# Patient Record
Sex: FEMALE
[De-identification: names, ages, dates, MRNs, and addresses within clinical notes are randomized per-mention and may not be internally consistent; named-entity substitution may affect disease eponyms.]

---

## 2017-05-22 ENCOUNTER — HOSPITAL ENCOUNTER (EMERGENCY)
Dept: HOSPITAL 42 - ED | Age: 37
Discharge: HOME | End: 2017-05-22
Payer: SELF-PAY

## 2017-05-22 VITALS — TEMPERATURE: 98.5 F | RESPIRATION RATE: 16 BRPM | OXYGEN SATURATION: 98 % | HEART RATE: 80 BPM

## 2017-05-22 VITALS — SYSTOLIC BLOOD PRESSURE: 106 MMHG | DIASTOLIC BLOOD PRESSURE: 73 MMHG

## 2017-05-22 VITALS — BODY MASS INDEX: 23.3 KG/M2

## 2017-05-22 DIAGNOSIS — M54.9: Primary | ICD-10-CM

## 2017-05-22 NOTE — ED PDOC
Arrival/HPI





- General


Chief Complaint: Back Pain


Time Seen by Provider: 17 17:35


Historian: Patient





- History of Present Illness


Narrative History of Present Illness (Text): 





17 18:12


This 36 yo female presents to this ED c/o upper back pain x 2 days.  Denies 

trauma, sob, cough, cp, rash, fever, or abnormal gait.  Patient admits lifting 

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


PERC negative for PE


Time/Duration: < week


Context: Home, Work





Past Medical History





- Provider Review


Nursing Documentation Reviewed: Yes





- Infectious Disease


Hx of Infectious Diseases: None





- Tetanus Immunization


Tetanus Immunization: Unknown





- Cardiac


Hx Cardiac Disorders: No





- Pulmonary


Hx Respiratory Disorders: No





- Neurological


Hx Neurological Disorder: No





- HEENT


Hx HEENT Disorder: No





- Renal


Hx Renal Disorder: No





- Endocrine/Metabolic


Hx Endocrine Disorders: No





- Hematological/Oncological


Hx Blood Disorders: No





- Integumentary


Hx Dermatological Disorder: No





- Musculoskeletal/Rheumatological


Hx Falls: No





- Gastrointestinal


Hx Gastrointestinal Disorders: No





- Genitourinary/Gynecological


Hx Genitourinary Disorders: Yes


Other/Comment: Hpv





- Psychiatric


Hx Psychophysiologic Disorder: No


Hx Substance Use: No





- Surgical History


Hx  Section: Yes (x2)





- Anesthesia


Hx Anesthesia: Yes


Hx Anesthesia Reactions: No


Hx Malignant Hyperthermia: No





Family/Social History





- Physician Review


Nursing Documentation Reviewed: Yes


Family/Social History: No Known Family HX


Smoking Status: Never Smoked


Hx Alcohol Use: No


Hx Substance Use: No





Allergies/Home Meds


Allergies/Adverse Reactions: 


Allergies





Penicillins Allergy (Verified 17 17:21)


 RASH


Sulfa (Sulfonamide Antibiotics) Allergy (Verified 17 17:21)


 RASH











Review of Systems





- Review of Systems


Constitutional: Normal.  absent: Fatigue, Weight Change, Fevers, Night Sweats


Eyes: Normal


ENT: Normal


Respiratory: Normal


Cardiovascular: Normal


Gastrointestinal: Normal


Genitourinary Female: Normal


Musculoskeletal: Other (Left upper back pain)


Skin: Normal


Neurological: Normal


Endocrine: Normal


Hemo/Lymphatic: Normal


Psychiatric: Normal





Physical Exam


Vital Signs











  Temp Pulse Resp BP Pulse Ox


 


 17 19:24  98.5 F  80  16   98


 


 17 17:23  98.8 F  79  17  106/73  100











Temperature: Afebrile


Blood Pressure: Normal


Pulse: Regular


Respiratory Rate: Normal


Appearance: Positive for: Well-Appearing, Non-Toxic, Comfortable


Pain Distress: None


Mental Status: Positive for: Alert and Oriented X 3





- Systems Exam


Head: Present: Atraumatic, Normocephalic


Pupils: Present: PERRL


Extroacular Muscles: Present: EOMI


Conjunctiva: Present: Normal


Mouth: Present: Moist Mucous Membranes


Neck: Present: Normal Range of Motion


Respiratory/Chest: Present: Clear to Auscultation, Good Air Exchange, Tender to 

Palpation (Mild tenderness over left scapula area.  Pain is 100 % reproducible.)

.  No: Respiratory Distress, Accessory Muscle Use, Decreased Breath Sounds, 

Rales, Retracting


Cardiovascular: Present: Regular Rate and Rhythm, Normal S1, S2.  No: Murmurs


Abdomen: Present: Normal Bowel Sounds.  No: Tenderness, Distention, Peritoneal 

Signs


Back: Present: Normal Inspection


Upper Extremity: Present: Normal Inspection.  No: Cyanosis, Edema


Lower Extremity: Present: Normal Inspection.  No: Edema


Neurological: Present: GCS=15, CN II-XII Intact, Speech Normal


Skin: Present: Warm, Dry, Normal Color.  No: Rashes


Psychiatric: Present: Alert, Oriented x 3, Normal Insight, Normal Concentration





Medical Decision Making


ED Course and Treatment: 





17 19:13


Re-evaluation.  Patient feels better.  Discussed results and plan with patient 

who expresses understanding.  All questions answered and there is agreement 

with the plan to discharge home with instructions.  Patient stable for 

discharge.  Return if symptoms persist or worsen.


Re-evaluation Time: 19:13


Reassessment Condition: Re-examined, Improved





- RAD Interpretation


Radiology Orders: 








17 17:44


CHEST TWO VIEWS (PA/LAT) [RAD] Stat 














- Medication Orders


Current Medication Orders: 











Discontinued Medications





Naproxen (Anaprox Ds)  550 mg PO STAT STA


   Stop: 17 19:12


   Last Admin: 17 19:23  Dose: 550 mg











Disposition/Present on Arrival





- Present on Arrival


Any Indicators Present on Arrival: No


History of DVT/PE: No


History of Uncontrolled Diabetes: No


Urinary Catheter: No


History of Decub. Ulcer: No


History Surgical Site Infection Following: None





- Disposition


Have Diagnosis and Disposition been Completed?: Yes


Diagnosis: 


 Upper back pain





Disposition: HOME/ ROUTINE


Disposition Time: 19:14


Patient Plan: Discharge


Condition: GOOD


Discharge Instructions (ExitCare):  Back Pain (ED)


Additional Instructions: 


Call private doctor for follow up visit in 1-2 days.   Take medication as 

instructed with food.   Return to emergency if pain worsen.


Prescriptions: 


Famotidine [Pepcid] 40 mg PO DAILY #14 tablet


Naproxen 500 mg PO BID PRN #14 tab


 PRN Reason: Pain, Severe (8-10)


Referrals: 


Jun Haq MD [Family Provider] - Follow up with primary

## 2017-05-23 NOTE — RAD
HISTORY:

upper back pain  



COMPARISON:

8/18/2016



TECHNIQUE:

Chest PA and lateral



FINDINGS:



LUNGS:

No active pulmonary disease.



PLEURA:

No significant pleural effusion identified. No pneumothorax apparent.



CARDIOVASCULAR:

Normal.



OSSEOUS STRUCTURES:

No significant abnormalities.



VISUALIZED UPPER ABDOMEN:

Normal.



OTHER FINDINGS:

None.



IMPRESSION:

No active disease.

## 2017-08-17 ENCOUNTER — HOSPITAL ENCOUNTER (EMERGENCY)
Dept: HOSPITAL 42 - ED | Age: 37
Discharge: HOME | End: 2017-08-17
Payer: COMMERCIAL

## 2017-08-17 VITALS — BODY MASS INDEX: 24 KG/M2

## 2017-08-17 VITALS — TEMPERATURE: 98.3 F

## 2017-08-17 VITALS — HEART RATE: 80 BPM | DIASTOLIC BLOOD PRESSURE: 66 MMHG | SYSTOLIC BLOOD PRESSURE: 97 MMHG | RESPIRATION RATE: 18 BRPM

## 2017-08-17 VITALS — OXYGEN SATURATION: 100 %

## 2017-08-17 DIAGNOSIS — G44.209: Primary | ICD-10-CM

## 2017-08-17 PROCEDURE — 96375 TX/PRO/DX INJ NEW DRUG ADDON: CPT

## 2017-08-17 PROCEDURE — 70450 CT HEAD/BRAIN W/O DYE: CPT

## 2017-08-17 PROCEDURE — 96374 THER/PROPH/DIAG INJ IV PUSH: CPT

## 2017-08-17 PROCEDURE — 99285 EMERGENCY DEPT VISIT HI MDM: CPT

## 2017-08-17 NOTE — ED PDOC
Arrival/HPI





- General


Chief Complaint: Headache


Time Seen by Provider: 17 13:03


Historian: Patient





- History of Present Illness


Narrative History of Present Illness (Text): 





17 13:04


36 y/o female, pmh including ovarian cyst/hyperlipidemia, penicillin and sulfa 

allergy, c/o headache with nausea x 4 days with no fall or trauma.  Generalized 

headache, tightness sensation around the head, gradually onset, on and off, no 

neck stiffness, no change in vision, headache associated with nausea but no 

vomiting, no chest pain or shortness of breath, no palpitation, no night sweat, 

no other medical or psychological complaints. 








Past Medical History





- Provider Review


Nursing Documentation Reviewed: Yes





- Infectious Disease


Hx of Infectious Diseases: None





- Tetanus Immunization


Tetanus Immunization: Unknown





- Cardiac


Hx Cardiac Disorders: No





- Pulmonary


Hx Respiratory Disorders: No





- Neurological


Hx Migraine: Yes





- HEENT


Hx HEENT Disorder: No





- Renal


Hx Renal Disorder: No





- Endocrine/Metabolic


Hx Endocrine Disorders: No





- Hematological/Oncological


Hx Blood Disorders: No





- Integumentary


Hx Dermatological Disorder: No





- Musculoskeletal/Rheumatological


Hx Falls: No





- Gastrointestinal


Hx Gastrointestinal Disorders: No





- Genitourinary/Gynecological


Hx Genitourinary Disorders: Yes


Other/Comment: Hpv





- Psychiatric


Hx Psychophysiologic Disorder: No


Hx Substance Use: No





- Surgical History


Hx  Section: Yes (x2)





- Anesthesia


Hx Anesthesia: Yes


Hx Anesthesia Reactions: No


Hx Malignant Hyperthermia: No





Family/Social History





- Physician Review


Nursing Documentation Reviewed: Yes


Family/Social History: Unknown Family HX


Smoking Status: Never Smoked


Hx Alcohol Use: No


Hx Substance Use: No





Allergies/Home Meds


Allergies/Adverse Reactions: 


Allergies





Penicillins Allergy (Verified 17 17:21)


 RASH


Sulfa (Sulfonamide Antibiotics) Allergy (Verified 17 17:21)


 RASH








Home Medications: 


 Home Meds











 Medication  Instructions  Recorded  Confirmed


 


Simvastatin [Simvastatin] 1 tab PO DAILY 17














Review of Systems





- Review of Systems


Constitutional: absent: Fatigue, Fevers


Eyes: absent: Vision Changes


ENT: absent: Hearing Changes


Respiratory: absent: SOB, Cough


Cardiovascular: absent: Chest Pain


Gastrointestinal: Nausea.  absent: Abdominal Pain, Diarrhea, Vomiting


Skin: absent: Rash, Pruritis


Neurological: Headache.  absent: Dizziness, Focal Weakness, Speech Changes





Physical Exam


Vital Signs Reviewed: Yes


Vital Signs











  Temp Pulse Resp BP Pulse Ox


 


 17 13:02  98.3 F  93 H  18  112/73  99











Temperature: Afebrile


Blood Pressure: Normal


Pulse: Regular


Respiratory Rate: Normal


Appearance: Positive for: Well-Appearing, Non-Toxic


Pain Distress: Severe


Mental Status: Positive for: Alert and Oriented X 3





- Systems Exam


Head: Present: Atraumatic, Normocephalic, Other (no temporal artery tenderness 

or jaw claudication )


Pupils: Present: PERRL


Extroacular Muscles: Present: EOMI


Conjunctiva: Present: Normal


Ears: Present: NORMAL TM, Normal Canal.  No: Erythema


Mouth: Present: Moist Mucous Membranes


Neck: Present: Normal Range of Motion, Paraspinal Tenderness (+ttp on the rt. 

paraspinal muscle region but no midline tenderness or step off. ), Trachea 

Midline.  No: Meningeal Signs, MIDLINE TENDERNESS


Respiratory/Chest: Present: Clear to Auscultation, Good Air Exchange.  No: 

Respiratory Distress, Accessory Muscle Use


Cardiovascular: Present: Regular Rate and Rhythm, Normal S1, S2.  No: Murmurs


Abdomen: Present: Normal Bowel Sounds.  No: Tenderness, Distention, Peritoneal 

Signs


Back: Present: Normal Inspection


Upper Extremity: Present: Normal Inspection.  No: Cyanosis, Edema


Lower Extremity: Present: Normal Inspection.  No: Edema


Neurological: Present: GCS=15, Speech Normal, Motor Func Grossly Intact, Gait 

Normal, Memory Normal, Other (no drift, no slurred speech)


Skin: Present: Warm, Dry, Normal Color.  No: Rashes


Psychiatric: Present: Alert, Oriented x 3, Normal Insight, Normal Concentration





Medical Decision Making


ED Course and Treatment: 





17 13:09


-IVF/benadryl/reglan/reglan (when CT head is negative)


-CT head


-observe and reassess





17 15:03


-CT head show no acute findings


-Headache resolved, feeling much better, explained all radiology result with 

the patient.


-Discharge home with naproxen, bed rest, stay hydraed, avoid over working or 

over stressing, follow up with your own pmd and neurologist within 2 days, 

return to the ER for any new or worsening signs or symptoms. 





- RAD Interpretation


Radiology Orders: 








17 13:06


HEAD W/O CONTRAST [CT] Stat 








Normal CT of the head


: Radiologist





- Medication Orders


Current Medication Orders: 








Ketorolac Tromethamine (Toradol)  30 mg IVP STAT STA


   Stop: 17 14:55





Discontinued Medications





Diphenhydramine HCl (Benadryl)  50 mg IVP STAT STA


   Stop: 17 13:07


   Last Admin: 17 13:36  Dose: 50 mg





Sodium Chloride (Sodium Chloride 0.9%)  1,000 mls @ 999 mls/hr IV .Q1H1M STA


   Stop: 17 14:06


   Last Admin: 17 13:36  Dose: 999 mls/hr





Metoclopramide HCl (Reglan)  10 mg IVP STAT STA


   Stop: 17 13:07


   Last Admin: 17 13:36  Dose: 10 mg











- PA / NP / Resident Statement


MD/ has reviewed & agrees with the documentation as recorded.





Disposition/Present on Arrival





- Present on Arrival


Any Indicators Present on Arrival: No


History of DVT/PE: No


History of Uncontrolled Diabetes: No


Urinary Catheter: No


History of Decub. Ulcer: No


History Surgical Site Infection Following: None





- Disposition


Have Diagnosis and Disposition been Completed?: Yes


Diagnosis: 


 Tension headache





Disposition: HOME/ ROUTINE


Disposition Time: 15:03


Patient Plan: Discharge


Condition: IMPROVED


Additional Instructions: 


-Discharge home with naproxen, bed rest, stay hydraed, avoid over working or 

over stressing, follow up with your own pmd and neurologist within 2 days, 

return to the ER for any new or worsening signs or symptoms. 


Prescriptions: 


Naproxen 500 mg PO BID PRN #20 tab


 PRN Reason: Other


Referrals: 


Jun Haq MD [Primary Care Provider] - Follow up with primary


Jn Swartz MD [Staff Provider] - Follow up with primary


Forms:  [employer]) (English), WORK NOTE

## 2017-08-17 NOTE — CT
PROCEDURE:  CT HEAD WITHOUT CONTRAST.



HISTORY:

generalized headache x 4 days



COMPARISON:

None available. 



TECHNIQUE:

Axial computed tomography images were obtained through the head/brain 

without intravenous contrast.  



Radiation dose:



Total exam DLP = 734 mGy-cm.



This CT exam was performed using one or more of the following dose 

reduction techniques: Automated exposure control, adjustment of the 

mA and/or kV according to patient size, and/or use of iterative 

reconstruction technique.



FINDINGS:



HEMORRHAGE:

No intracranial hemorrhage. 



BRAIN:

No mass effect or edema.  No atrophy or chronic microvascular 

ischemic changes.



VENTRICLES:

Unremarkable. No hydrocephalus. 



CALVARIUM:

Unremarkable.



PARANASAL SINUSES:

Unremarkable as visualized. No significant inflammatory changes.



MASTOID AIR CELLS:

Unremarkable as visualized. No inflammatory changes.



OTHER FINDINGS:

None.



IMPRESSION:

Normal CT of the Head.

## 2018-03-16 ENCOUNTER — HOSPITAL ENCOUNTER (EMERGENCY)
Dept: HOSPITAL 42 - ED | Age: 38
Discharge: HOME | End: 2018-03-16
Payer: COMMERCIAL

## 2018-03-16 VITALS — DIASTOLIC BLOOD PRESSURE: 60 MMHG | OXYGEN SATURATION: 99 % | SYSTOLIC BLOOD PRESSURE: 94 MMHG | HEART RATE: 79 BPM

## 2018-03-16 VITALS — TEMPERATURE: 98.5 F

## 2018-03-16 VITALS — RESPIRATION RATE: 18 BRPM

## 2018-03-16 VITALS — BODY MASS INDEX: 24 KG/M2

## 2018-03-16 DIAGNOSIS — R07.9: Primary | ICD-10-CM

## 2018-03-16 LAB
ALBUMIN SERPL-MCNC: 4.7 G/DL
ALBUMIN/GLOB SERPL: 1.2 {RATIO}
ALT SERPL-CCNC: 21 U/L
AST SERPL-CCNC: 21 U/L
BASOPHILS # BLD AUTO: 0.03 K/MM3
BASOPHILS NFR BLD: 0.3 %
BUN SERPL-MCNC: 8 MG/DL
CALCIUM SERPL-MCNC: 10.2 MG/DL
EOSINOPHIL # BLD: 0 10*3/UL
EOSINOPHIL NFR BLD: 0.4 %
ERYTHROCYTE [DISTWIDTH] IN BLOOD BY AUTOMATED COUNT: 12.9 %
GFR NON-AFRICAN AMERICAN: > 60
GRANULOCYTES # BLD: 5.74 10*3/UL
GRANULOCYTES NFR BLD: 57.1 %
HGB BLD-MCNC: 12.3 G/DL
LYMPHOCYTES # BLD: 3.7 10*3/UL
LYMPHOCYTES NFR BLD AUTO: 36.7 %
MCH RBC QN AUTO: 29 PG
MCHC RBC AUTO-ENTMCNC: 33.5 G/DL
MCV RBC AUTO: 86.6 FL
MONOCYTES # BLD AUTO: 0.6 10*3/UL
MONOCYTES NFR BLD: 5.5 %
PLATELET # BLD: 317 10^3/UL
PMV BLD AUTO: 9.8 FL
RBC # BLD AUTO: 4.24 10^6/UL
TROPONIN I SERPL-MCNC: < 0.01 NG/ML
WBC # BLD AUTO: 10 10^3/UL

## 2018-03-16 NOTE — ED PDOC
Arrival/HPI





- General


Chief Complaint: Chest Pain


Time Seen by Provider: 18 19:30





- History of Present Illness


Narrative History of Present Illness (Text): 


37 y/o F c PSHx C section, most recently 2 years ago, non smoker p/w 

palpitations and chest pain x 1 week. States she gets intermittent palpitations

, after which she feels a nonradiating, sharp L parasternal chest pain and L 

upper thoracic back pain for about 2 hours. She states she does feel mildly 

short of breath. She denies fever, chills, nausea, vomiting, leg swelling, 

recent travel, recent injury.





Past Medical History





- Infectious Disease


Hx of Infectious Diseases: None





- Tetanus Immunization


Tetanus Immunization: Unknown





- Cardiac


Hx Cardiac Disorders: No





- Pulmonary


Hx Respiratory Disorders: No





- Neurological


Hx Migraine: Yes





- HEENT


Hx HEENT Disorder: No





- Renal


Hx Renal Disorder: No





- Endocrine/Metabolic


Hx Endocrine Disorders: No





- Hematological/Oncological


Hx Blood Disorders: No





- Integumentary


Hx Dermatological Disorder: No





- Musculoskeletal/Rheumatological


Hx Falls: No





- Gastrointestinal


Hx Gastrointestinal Disorders: No





- Genitourinary/Gynecological


Hx Genitourinary Disorders: Yes


Other/Comment: Hpv





- Psychiatric


Hx Psychophysiologic Disorder: No


Hx Substance Use: No





- Surgical History


Hx  Section: Yes (x2)





- Anesthesia


Hx Anesthesia: Yes


Hx Anesthesia Reactions: No


Hx Malignant Hyperthermia: No





Family/Social History


Family/Social History: No Known Family HX


Smoking Status: Never Smoked


Hx Alcohol Use: No


Hx Substance Use: No





Allergies/Home Meds


Allergies/Adverse Reactions: 


Allergies





Penicillins Allergy (Verified 18 19:21)


 RASH


Sulfa (Sulfonamide Antibiotics) Allergy (Verified 18 19:21)


 RASH








Home Medications: 


 Home Meds











 Medication  Instructions  Recorded  Confirmed


 


Simvastatin [Simvastatin] 1 tab PO DAILY 17














Review of Systems





- Physician Review


All systems were reviewed & negative as marked: Yes





- Review of Systems


Constitutional: absent: Fevers


Gastrointestinal: absent: Vomiting





Physical Exam





- Physical Exam


Narrative Physical Exam (Text): 


Gen: NAD


Head: NC


Eyes: No scleral icterus


ENT: MMM


Neck: Supple


Chest: No tenderness


CV: Borderline tachycardia


Lungs: CTA b/l


Abd: Soft, NT


Back: No CVA tenderness


Extremities: No swelling or tenderness


Skin: No rash


Neuro: Alert, no focal deficit


Vital Signs











  Temp Pulse Resp BP Pulse Ox


 


 18 21:36   85  18  101/68  100


 


 18 19:21  98.5 F  97 H  19  110/88  100


 


 18 19:19  98.5 F  97 H  19  110/88  100














Medical Decision Making


ED Course and Treatment: 


EKG NSR 89 bpm, no ST eelvations, normal axis.





CXR no PTX, pleural effusion, rib fracture, consolidation as read by me.





Patient in no distress, will discharge home, f/u primary care, return to ED for 

worsening breathing, pain, fever, or any other problem.





- Lab Interpretations


Lab Results: 








 18 19:50 





 18 19:50 





 Lab Results





18 19:50: Urine HCG, Qual Negative


18 19:50: Sodium 142, Potassium 4.0, Chloride 105, Carbon Dioxide 25, 

Anion Gap 16, BUN 8, Creatinine 0.6 L, Est GFR ( Amer) > 60, Est GFR (Non

-Af Amer) > 60, Random Glucose 95, Calcium 10.2, Total Bilirubin 0.2, AST 21, 

ALT 21, Alkaline Phosphatase 69, Total Creatine Kinase 58, Troponin I < 0.01, 

Total Protein 8.7 H, Albumin 4.7, Globulin 3.9, Albumin/Globulin Ratio 1.2


18 19:50: D-Dimer, Quantitative < 200


18 19:50: WBC 10.0  D, RBC 4.24, Hgb 12.3, Hct 36.7, MCV 86.6, MCH 29.0, 

MCHC 33.5, RDW 12.9, Plt Count 317, MPV 9.8, Gran % 57.1, Lymph % (Auto) 36.7 H

, Mono % (Auto) 5.5, Eos % (Auto) 0.4 L, Baso % (Auto) 0.3, Gran # 5.74, Lymph 

# (Auto) 3.7 H, Mono # (Auto) 0.6, Eos # (Auto) 0.0, Baso # (Auto) 0.03











- RAD Interpretation


Radiology Orders: 








18 19:39


CHEST TWO VIEWS (PA/LAT) [RAD] Stat 














Disposition/Present on Arrival





- Present on Arrival


Any Indicators Present on Arrival: No


History of DVT/PE: No


History of Uncontrolled Diabetes: No


Urinary Catheter: No


History of Decub. Ulcer: No


History Surgical Site Infection Following: None





- Disposition


Have Diagnosis and Disposition been Completed?: Yes


Diagnosis: 


 Chest pain





Disposition: HOME/ ROUTINE


Disposition Time: 22:01


Patient Plan: Discharge


Condition: STABLE


Discharge Instructions (ExitCare):  Chest Pain (ED)


Referrals: 


Jun Haq MD [Primary Care Provider] - Follow up with primary


Forms:  [employer] (English)

## 2018-03-17 NOTE — CARD
--------------- APPROVED REPORT --------------





EKG Measurement

Heart [id number]

ME [id number]

[id number]

[id number]

[id number]



<Conclusion>

Normal sinus rhythm

Possible Left atrial enlargement

Septal infarct, old

NSSTW changes

No change

## 2018-05-27 ENCOUNTER — HOSPITAL ENCOUNTER (EMERGENCY)
Dept: HOSPITAL 42 - ED | Age: 38
Discharge: HOME | End: 2018-05-27
Payer: COMMERCIAL

## 2018-05-27 VITALS — HEART RATE: 68 BPM | SYSTOLIC BLOOD PRESSURE: 109 MMHG | DIASTOLIC BLOOD PRESSURE: 75 MMHG

## 2018-05-27 VITALS — RESPIRATION RATE: 18 BRPM

## 2018-05-27 VITALS — BODY MASS INDEX: 24 KG/M2

## 2018-05-27 VITALS — TEMPERATURE: 98.7 F | OXYGEN SATURATION: 99 %

## 2018-05-27 DIAGNOSIS — R10.13: ICD-10-CM

## 2018-05-27 DIAGNOSIS — N83.201: Primary | ICD-10-CM

## 2018-05-27 LAB
ALBUMIN SERPL-MCNC: 4.5 G/DL (ref 3–4.8)
ALBUMIN/GLOB SERPL: 1.3 {RATIO} (ref 1.1–1.8)
ALT SERPL-CCNC: 29 U/L (ref 7–56)
APPEARANCE UR: CLEAR
AST SERPL-CCNC: 22 U/L (ref 14–36)
BACTERIA #/AREA URNS HPF: (no result) /[HPF]
BASOPHILS # BLD AUTO: 0.02 K/MM3 (ref 0–2)
BASOPHILS NFR BLD: 0.3 % (ref 0–3)
BILIRUB UR-MCNC: NEGATIVE MG/DL
BUN SERPL-MCNC: 13 MG/DL (ref 7–21)
CALCIUM SERPL-MCNC: 9.1 MG/DL (ref 8.4–10.5)
COLOR UR: YELLOW
EOSINOPHIL # BLD: 0.1 10*3/UL (ref 0–0.7)
EOSINOPHIL NFR BLD: 1.7 % (ref 1.5–5)
ERYTHROCYTE [DISTWIDTH] IN BLOOD BY AUTOMATED COUNT: 13.1 % (ref 11.5–14.5)
GFR NON-AFRICAN AMERICAN: > 60
GLUCOSE UR STRIP-MCNC: NEGATIVE MG/DL
GRANULOCYTES # BLD: 3.5 10*3/UL (ref 1.4–6.5)
GRANULOCYTES NFR BLD: 54.4 % (ref 50–68)
HCG,QUALITATIVE URINE: NEGATIVE
HGB BLD-MCNC: 11.7 G/DL (ref 12–16)
INR PPP: 1.04 (ref 0.93–1.08)
LEUKOCYTE ESTERASE UR-ACNC: NEGATIVE LEU/UL
LIPASE SERPL-CCNC: 92 U/L (ref 23–300)
LYMPHOCYTES # BLD: 2.3 10*3/UL (ref 1.2–3.4)
LYMPHOCYTES NFR BLD AUTO: 36.3 % (ref 22–35)
MCH RBC QN AUTO: 29 PG (ref 25–35)
MCHC RBC AUTO-ENTMCNC: 33.6 G/DL (ref 31–37)
MCV RBC AUTO: 86.1 FL (ref 80–105)
MONOCYTES # BLD AUTO: 0.5 10*3/UL (ref 0.1–0.6)
MONOCYTES NFR BLD: 7.3 % (ref 1–6)
PH UR STRIP: 6.5 [PH] (ref 4.7–8)
PLATELET # BLD: 325 10^3/UL (ref 120–450)
PMV BLD AUTO: 9.7 FL (ref 7–11)
PROT UR STRIP-MCNC: NEGATIVE MG/DL
PROTHROMBIN TIME: 12 SECONDS (ref 9.4–12.5)
RBC # BLD AUTO: 4.04 10^6/UL (ref 3.5–6.1)
RBC # UR STRIP: (no result) /UL
RBC #/AREA URNS HPF: (no result) /HPF (ref 0–2)
SP GR UR STRIP: <= 1.005 (ref 1–1.03)
UROBILINOGEN UR STRIP-ACNC: 0.2 E.U./DL
WBC # BLD AUTO: 6.4 10^3/UL (ref 4.5–11)
WBC #/AREA URNS HPF: (no result) /HPF (ref 0–6)

## 2018-05-27 PROCEDURE — 84703 CHORIONIC GONADOTROPIN ASSAY: CPT

## 2018-05-27 PROCEDURE — 96361 HYDRATE IV INFUSION ADD-ON: CPT

## 2018-05-27 PROCEDURE — 76700 US EXAM ABDOM COMPLETE: CPT

## 2018-05-27 PROCEDURE — 81025 URINE PREGNANCY TEST: CPT

## 2018-05-27 PROCEDURE — 74177 CT ABD & PELVIS W/CONTRAST: CPT

## 2018-05-27 PROCEDURE — 83690 ASSAY OF LIPASE: CPT

## 2018-05-27 PROCEDURE — 85610 PROTHROMBIN TIME: CPT

## 2018-05-27 PROCEDURE — 81001 URINALYSIS AUTO W/SCOPE: CPT

## 2018-05-27 PROCEDURE — 80053 COMPREHEN METABOLIC PANEL: CPT

## 2018-05-27 PROCEDURE — 96374 THER/PROPH/DIAG INJ IV PUSH: CPT

## 2018-05-27 PROCEDURE — 85025 COMPLETE CBC W/AUTO DIFF WBC: CPT

## 2018-05-27 PROCEDURE — 99285 EMERGENCY DEPT VISIT HI MDM: CPT

## 2018-05-27 NOTE — ED PDOC
Arrival/HPI





- General


Chief Complaint: Abdominal Pain


Time Seen by Provider: 18 11:05


Historian: Patient





- History of Present Illness


Narrative History of Present Illness (Text): 


18 11:38


A 38 year old female, whose past medical history includes HPV, presents to the 

emergency department complaining of epigastric pain for 4 days. Patient reports 

symptom worsens when eating. Patient denies any other complaints at this time. 

Also, patient denies any family history of gallbladder disease or gallstones.





No PMD








Past Medical History





- Provider Review


Nursing Documentation Reviewed: Yes





- Infectious Disease


Hx of Infectious Diseases: None





- Tetanus Immunization


Tetanus Immunization: Unknown





- Cardiac


Hx Cardiac Disorders: No





- Pulmonary


Hx Respiratory Disorders: No





- Neurological


Hx Migraine: Yes





- HEENT


Hx HEENT Disorder: No





- Renal


Hx Renal Disorder: No





- Endocrine/Metabolic


Hx Endocrine Disorders: No





- Hematological/Oncological


Hx Blood Disorders: No





- Integumentary


Hx Dermatological Disorder: No





- Musculoskeletal/Rheumatological


Hx Falls: No





- Gastrointestinal


Hx Gastrointestinal Disorders: No





- Genitourinary/Gynecological


Hx Genitourinary Disorders: Yes


Other/Comment: Hpv





- Psychiatric


Hx Psychophysiologic Disorder: No


Hx Substance Use: No





- Surgical History


Hx  Section: Yes (x2)





- Anesthesia


Hx Anesthesia: Yes


Hx Anesthesia Reactions: No


Hx Malignant Hyperthermia: No





Family/Social History





- Physician Review


Nursing Documentation Reviewed: Yes


Family/Social History: No Known Family HX


Smoking Status: Never Smoked


Hx Alcohol Use: No


Hx Substance Use: No





Allergies/Home Meds


Allergies/Adverse Reactions: 


Allergies





Penicillins Allergy (Verified 18 19:21)


 RASH


Sulfa (Sulfonamide Antibiotics) Allergy (Verified 18 19:21)


 RASH








Home Medications: 


 Home Meds











 Medication  Instructions  Recorded  Confirmed


 


Simvastatin [Simvastatin] 1 tab PO DAILY 17














Review of Systems





- Physician Review


All systems were reviewed & negative as marked: Yes





- Review of Systems


Constitutional: absent: Fevers, Night Sweats


Respiratory: absent: SOB, Cough


Cardiovascular: absent: Chest Pain, Palpitations


Gastrointestinal: Abdominal Pain (epigastric region).  absent: Diarrhea, Nausea

, Vomiting


Neurological: absent: Headache, Dizziness





Physical Exam


Vital Signs Reviewed: Yes


Vital Signs











  Temp Pulse Resp BP Pulse Ox


 


 18 14:00   70  18  107/73  99


 


 18 11:17   79  18  105/68  99


 


 18 10:16  98.7 F  82  16  103/72  99











Temperature: Afebrile


Blood Pressure: Normal


Pulse: Regular


Respiratory Rate: Normal


Appearance: Positive for: Well-Appearing


Pain Distress: None


Mental Status: Positive for: Alert and Oriented X 3





- Systems Exam


Respiratory/Chest: Present: Clear to Auscultation, Good Air Exchange.  No: 

Respiratory Distress, Accessory Muscle Use


Cardiovascular: Present: Regular Rate and Rhythm, Normal S1, S2.  No: Murmurs


Abdomen: Present: Tenderness (epigastric region), Other (positive Haskins's sign)

.  No: Peritoneal Signs


Upper Extremity: Present: Normal Inspection.  No: Cyanosis, Edema


Lower Extremity: Present: Normal Inspection.  No: Edema


Neurological: Present: GCS=15, CN II-XII Intact, Speech Normal


Skin: Present: Warm, Dry, Normal Color.  No: Rashes


Psychiatric: Present: Alert, Oriented x 3, Normal Insight, Normal Concentration





Medical Decision Making


ED Course and Treatment: 


18 11:41


Impression: 38 year old female with epigastric pain. Physical exam shows 

tenderness to epigastric region and positive Haskins's sign.





Plan:


-- Abdominal Ultrasound


-- Abd/Pelvis CT


-- Labs


-- Urinalysis


-- Toradol


-- Morphine


-- Zofran


-- IV Fluids


-- Reassess and disposition





Prior Visits:


Notes and results from previous visits were reviewed. Patient was last seen in 

the emergency department on 2018 for palpitations and chest pain. Patient 

was discharged home.





Progress Notes:


2018 12:37


Abdominal Ultrasound


IMPRESSION: 


Increased hepatic echotexture likely due to fatty infiltration however other 

infiltrative hepatic cellular disease process not excluded.


No evidence cholelithiasis.


Dictator: Rosy Capps DO








- Lab Interpretations


Lab Results: 








 18 11:41 





 18 11:41 





 Lab Results





18 11:41: Sodium 146, Potassium 3.8, Chloride 106, Carbon Dioxide 27, 

Anion Gap 17, BUN 13, Creatinine 0.6 L, Est GFR ( Amer) > 60, Est GFR (

Non-Af Amer) > 60, Random Glucose 94, Calcium 9.1, Total Bilirubin 0.3, AST 22, 

ALT 29, Alkaline Phosphatase 59, Total Protein 8.0, Albumin 4.5, Globulin 3.5, 

Albumin/Globulin Ratio 1.3, Lipase 92


18 11:41: PT 12.0, INR 1.04


18 11:41: WBC 6.4  D, RBC 4.04, Hgb 11.7 L, Hct 34.8 L, MCV 86.1, MCH 29.0

, MCHC 33.6, RDW 13.1, Plt Count 325, MPV 9.7, Gran % 54.4, Lymph % (Auto) 36.3 

H, Mono % (Auto) 7.3 H, Eos % (Auto) 1.7, Baso % (Auto) 0.3, Gran # 3.50, Lymph 

# (Auto) 2.3, Mono # (Auto) 0.5, Eos # (Auto) 0.1, Baso # (Auto) 0.02


18 10:50: Urine Color Yellow, Urine Appearance Clear, Urine pH 6.5, Ur 

Specific Gravity <= 1.005, Urine Protein Negative, Urine Glucose (UA) Negative, 

Urine Ketones Negative, Urine Blood Trace-intact H, Urine Nitrate Negative, 

Urine Bilirubin Negative, Urine Urobilinogen 0.2, Ur Leukocyte Esterase Negative

, Urine RBC 0 - 2, Urine WBC 0 - 2, Ur Epithelial Cells 1 - 3, Urine Bacteria 

Few, Urine HCG, Qual Negative








I have reviewed the lab results: Yes





- RAD Interpretation


Radiology Orders: 








18 11:35


ABDOMEN COMPLETE [US] Stat 





18 13:07


ABD PELVIS PO & IV CONTRAST [CT] Stat 














- Medication Orders


Current Medication Orders: 











Discontinued Medications





Sodium Chloride (Sodium Chloride 0.9%)  1,000 mls @ 999 mls/hr IV .Q1H1M STA


   Stop: 18 12:36


   Last Admin: 18 12:53  Dose: 999 mls/hr





eMAR Start Stop


 Document     18 12:53  SRE  (Rec: 18 12:53  SRE  [id number])


     Intravenous Solution


      Start Date                                 18


      Start Time                                 12:00


      End Date                                   18


      End time                                   13:00


      Total Infusion Time                        60





Sodium Chloride (Sodium Chloride 0.9%)  1,000 mls @ 999 mls/hr IV .Q1H1M STA


   Stop: 18 12:38


   Last Admin: 18 12:52  Dose: 999 mls/hr





eMAR Start Stop


 Document     18 12:52  SRE  (Rec: 18 12:52  SRE  [id number])


     Intravenous Solution


      Start Date                                 18


      Start Time                                 11:40


      End Date                                   18


      End time                                   12:40


      Total Infusion Time                        60





Ketorolac Tromethamine (Toradol)  30 mg IVP STAT STA


   Stop: 18 11:40


   Last Admin: 18 12:49  Dose: 30 mg





MAR Pain Assessment


 Document     18 12:49  SRE  (Rec: 18 12:50  SRE  [id number])


     Pain Reassessment


      Is this a pain reassessment?               Yes


     Sleep


      Is patient sleeping during reassessment?   No


     Presence of Pain


      Presence of Pain                           Yes


     Location


      Pain Location Body Site                    Abdomen


IVP Administration


 Document     18 12:49  SRE  (Rec: 18 12:50  SRE  [id number])


     Charges for Administration


      # of IVP Administrations                   1





Morphine Sulfate (Morphine)  4 mg IVP STAT STA


   Stop: 18 11:38


   Last Admin: 18 11:40  Dose:  





Ondansetron HCl (Zofran Inj)  4 mg IVP STAT STA


   Stop: 18 11:39


   Last Admin: 18 11:40  Dose:  











- Scribe Statement


The provider has reviewed the documentation as recorded by the Vin Gutierrez





Provider Scribe Attestation:


All medical record entries made by the Shyamibhernan were at my direction and 

personally dictated by me. I have reviewed the chart and agree that the record 

accurately reflects my personal performance of the history, physical exam, 

medical decision making, and the department course for this patient. I have 

also personally directed, reviewed, and agree with the discharge instructions 

and disposition.








Disposition/Present on Arrival





- Present on Arrival


Any Indicators Present on Arrival: No


History of DVT/PE: No


History of Uncontrolled Diabetes: No


Urinary Catheter: No


History of Decub. Ulcer: No


History Surgical Site Infection Following: None





- Disposition


Have Diagnosis and Disposition been Completed?: Yes


Diagnosis: 


 Abdominal pain, Right ovarian cyst





Disposition: HOME/ ROUTINE


Disposition Time: 16:09


Patient Plan: Discharge


Condition: GOOD


Discharge Instructions (ExitCare):  Acute Abdomen (Belly Pain), Adult (DC), 

Ovarian Cyst (DC)


Additional Instructions: 


Pepcid is for discomfort - Twice a day


Zofran ODT is for nausea - Three times a day





________________


Follow up with both Gynecology and Gastroenterology Both.





________________


Return to us if worse or new symptoms occur.











Best-


Dr. Familia Correa


Referrals: 


Jade Martinez MD [Medical Doctor] - Follow up with primary


John Ortiz MD [Staff Provider] - Follow up with primary


Forms:  [employer] (English)

## 2018-05-27 NOTE — US
HISTORY:

Biliary colic 



COMPARISON:

No prior study available comparison



TECHNIQUE:

Sonographic evaluation of the abdomen.



FINDINGS:



LIVER:

Measures 14 cm cm.  Liver demonstrates smooth contour however 

increased echogenicity likely related to fatty infiltration however 

other infiltrative hepatic cellular disease process not excluded. . 

No obvious masses or collections seen on images presented. . No 

significant intrahepatic bile duct dilatation.



GALLBLADDER:

Unremarkable. No gallstones. No pericholecystic fluid collections or 

sonographic Haskins sign 



COMMON BILE DUCT:

Measures 2.4 mm. No stones. No dilatation.



PANCREAS:

Unremarkable as visualized. No mass. No ductal dilatation.



RIGHT KIDNEY:

Measures 12.2 x 3.7 x 5.3cm. Normal echogenicity. No calculus, mass, 

or hydronephrosis.



LEFT KIDNEY:

Measures 11.3 x 5.0 x 5.1cm. Normal echogenicity. No calculus, mass, 

or hydronephrosis.



SPLEEN:

Normal in size and contour. No mass.



AORTA:

No aneurysmal dilatation. 



IVC:

Unremarkable. 



OTHER FINDINGS:

None. 



IMPRESSION:

Increased hepatic echotexture likely due to fatty infiltration 

however other infiltrative hepatic cellular disease process not 

excluded.



No evidence cholelithiasis.

## 2018-05-27 NOTE — CT
PROCEDURE:  CT abdomen pelvis dated 05/27/2018 



HISTORY:

Epigastric pain 



COMPARISON:

The Comparison made with CT scan abdomen pelvis 01/02/2017. 

Correlation also made with concurrent abdominal ultrasound. .



TECHNIQUE:

Contiguous axial images of the abdomen and pelvis. Oral contrast was 

administered. No IV contrast given. Coronal and Sagittal reformats 

generated. 



This CT exam was performed using one or more of the following dose 

reduction techniques: Automated exposure control, adjustment of the 

mA and/or kV according to patient size, and/or use of iterative 

reconstruction technique.



Contrast dose: 100 cc Omnipaque 350



Radiation dose:



Total exam DLP = 300.39 mGy-cm.



The the 



FINDINGS:



LOWER THORAX:

Minor passive/dependent type atelectasis both posterior sulci. No 

evidence of focal consolidation effusion or basilar pneumothorax. Few 

tiny noncalcified nodules both lung bases. 



There is a small hiatal hernia. 



LIVER:

Liver demonstrates normal size. No obvious hepatic mass collection or 

calcification.  Portal and splenic veins are opacified. The



GALLBLADDER AND BILE DUCTS:

Gallbladder physiologically distended. No evidence of intraluminal 

gallbladder calculi. 



PANCREAS:

Pancreas appears grossly unremarkable without masses collections or 

calcifications.  No significant pancreatic ductal dilatation.



SPLEEN:

Spleen exhibits normal size and attenuation pattern. 



ADRENALS:

No adrenal lesions. 



KIDNEYS AND URETERS:

Kidneys demonstrate symmetric nephrograms.  No evidence of 

nephrolithiasis or hydronephrosis.



BLADDER:

Urinary bladder incompletely distended which may in part account for 

thick-walled appearance.  Correlation with urinalysis recommended to 

exclude the possibility of a cystitis. .



REPRODUCTIVE:

There is a relatively large right adnexal cyst that measures 

approximately 3.7 x 3.1 cm. 



APPENDIX:

Normal-appearing appendix of best seen on axial image number [id number] 

[id number]. No periappendiceal inflammatory changes. The the the



BOWEL:

Evaluation of the bowel is slightly limited due to incomplete 

opacification. The stomach is unopacified and incompletely distended 

which in part accounts for thick-walled appearance. . 



Visualized loops of small bowel exhibit normal contour and caliber. 

No evidence of acute mechanical small bowel obstruction.  There is a 

small umbilical hernia that contains mesenteric fat and unobstructed 

loops of small bowel. No evidence of acute mechanical small bowel 

obstruction with oral contrast material seen extending into the colon 

to the level of the proximal transverse colon.  Moderate amount of 

stool seen throughout the cecum ascending transverse and proximal - 

mid descending colon consistent with fecal retention/ constipation.



PERITONEUM:

Unremarkable. No fluid collection. No free air.



LYMPH NODES:

Unremarkable. No enlarged lymph nodes. 



VASCULATURE:

Unremarkable. No aortic aneurysm. 



BONES:

No fracture or destructive lesion. 



OTHER FINDINGS:

None. 



IMPRESSION:

There is a moderately large right adnexal cyst. 



Small umbilical hernia again noted which contains mesenteric fat and 

a loop of unobstructed small bowel. 



Few tiny noncalcified nodules both lung bases